# Patient Record
Sex: FEMALE | Race: ASIAN | NOT HISPANIC OR LATINO | URBAN - METROPOLITAN AREA
[De-identification: names, ages, dates, MRNs, and addresses within clinical notes are randomized per-mention and may not be internally consistent; named-entity substitution may affect disease eponyms.]

---

## 2018-08-13 ENCOUNTER — INPATIENT (INPATIENT)
Facility: HOSPITAL | Age: 63
LOS: 3 days | Discharge: HOME | DRG: 394 | End: 2018-08-17
Attending: SURGERY | Admitting: SURGERY
Payer: SELF-PAY

## 2018-08-13 VITALS
DIASTOLIC BLOOD PRESSURE: 75 MMHG | OXYGEN SATURATION: 96 % | SYSTOLIC BLOOD PRESSURE: 120 MMHG | HEART RATE: 74 BPM | RESPIRATION RATE: 18 BRPM

## 2018-08-13 PROCEDURE — 99291 CRITICAL CARE FIRST HOUR: CPT

## 2018-08-13 PROCEDURE — 99053 MED SERV 10PM-8AM 24 HR FAC: CPT

## 2018-08-14 DIAGNOSIS — K66.1 HEMOPERITONEUM: ICD-10-CM

## 2018-08-14 LAB
ALBUMIN SERPL ELPH-MCNC: 4 G/DL — SIGNIFICANT CHANGE UP (ref 3.3–5)
ALP SERPL-CCNC: 59 U/L — SIGNIFICANT CHANGE UP (ref 40–120)
ALT FLD-CCNC: 11 U/L — SIGNIFICANT CHANGE UP (ref 10–45)
ANION GAP SERPL CALC-SCNC: 12 MMOL/L — SIGNIFICANT CHANGE UP (ref 5–17)
APTT BLD: 26.3 SEC — LOW (ref 27.5–37.4)
AST SERPL-CCNC: 20 U/L — SIGNIFICANT CHANGE UP (ref 10–40)
BASOPHILS # BLD AUTO: 0 K/UL — SIGNIFICANT CHANGE UP (ref 0–0.2)
BASOPHILS NFR BLD AUTO: 0.5 % — SIGNIFICANT CHANGE UP (ref 0–2)
BILIRUB SERPL-MCNC: 0.6 MG/DL — SIGNIFICANT CHANGE UP (ref 0.2–1.2)
BLD GP AB SCN SERPL QL: NEGATIVE — SIGNIFICANT CHANGE UP
BUN SERPL-MCNC: 14 MG/DL — SIGNIFICANT CHANGE UP (ref 7–23)
CALCIUM SERPL-MCNC: 8.6 MG/DL — SIGNIFICANT CHANGE UP (ref 8.4–10.5)
CHLORIDE SERPL-SCNC: 106 MMOL/L — SIGNIFICANT CHANGE UP (ref 96–108)
CO2 SERPL-SCNC: 23 MMOL/L — SIGNIFICANT CHANGE UP (ref 22–31)
CREAT SERPL-MCNC: 0.58 MG/DL — SIGNIFICANT CHANGE UP (ref 0.5–1.3)
EOSINOPHIL # BLD AUTO: 0 K/UL — SIGNIFICANT CHANGE UP (ref 0–0.5)
EOSINOPHIL NFR BLD AUTO: 0.6 % — SIGNIFICANT CHANGE UP (ref 0–6)
GAS PNL BLDV: SIGNIFICANT CHANGE UP
GLUCOSE SERPL-MCNC: 116 MG/DL — HIGH (ref 70–99)
HCT VFR BLD CALC: 29.5 % — LOW (ref 34.5–45)
HCT VFR BLD CALC: 30.2 % — LOW (ref 34.5–45)
HCT VFR BLD CALC: 30.3 % — LOW (ref 34.5–45)
HCT VFR BLD CALC: 31.2 % — LOW (ref 34.5–45)
HGB BLD-MCNC: 10.1 G/DL — LOW (ref 11.5–15.5)
HGB BLD-MCNC: 10.2 G/DL — LOW (ref 11.5–15.5)
HGB BLD-MCNC: 10.4 G/DL — LOW (ref 11.5–15.5)
HGB BLD-MCNC: 10.8 G/DL — LOW (ref 11.5–15.5)
INR BLD: 1.03 RATIO — SIGNIFICANT CHANGE UP (ref 0.88–1.16)
LYMPHOCYTES # BLD AUTO: 1.2 K/UL — SIGNIFICANT CHANGE UP (ref 1–3.3)
LYMPHOCYTES # BLD AUTO: 17.3 % — SIGNIFICANT CHANGE UP (ref 13–44)
MCHC RBC-ENTMCNC: 32.4 PG — SIGNIFICANT CHANGE UP (ref 27–34)
MCHC RBC-ENTMCNC: 32.6 PG — SIGNIFICANT CHANGE UP (ref 27–34)
MCHC RBC-ENTMCNC: 32.7 PG — SIGNIFICANT CHANGE UP (ref 27–34)
MCHC RBC-ENTMCNC: 33 PG — SIGNIFICANT CHANGE UP (ref 27–34)
MCHC RBC-ENTMCNC: 33.7 GM/DL — SIGNIFICANT CHANGE UP (ref 32–36)
MCHC RBC-ENTMCNC: 34.1 GM/DL — SIGNIFICANT CHANGE UP (ref 32–36)
MCHC RBC-ENTMCNC: 34.2 GM/DL — SIGNIFICANT CHANGE UP (ref 32–36)
MCHC RBC-ENTMCNC: 34.5 GM/DL — SIGNIFICANT CHANGE UP (ref 32–36)
MCV RBC AUTO: 95.5 FL — SIGNIFICANT CHANGE UP (ref 80–100)
MCV RBC AUTO: 95.6 FL — SIGNIFICANT CHANGE UP (ref 80–100)
MCV RBC AUTO: 95.6 FL — SIGNIFICANT CHANGE UP (ref 80–100)
MCV RBC AUTO: 96 FL — SIGNIFICANT CHANGE UP (ref 80–100)
MONOCYTES # BLD AUTO: 0.3 K/UL — SIGNIFICANT CHANGE UP (ref 0–0.9)
MONOCYTES NFR BLD AUTO: 4.5 % — SIGNIFICANT CHANGE UP (ref 2–14)
NEUTROPHILS # BLD AUTO: 5.1 K/UL — SIGNIFICANT CHANGE UP (ref 1.8–7.4)
NEUTROPHILS NFR BLD AUTO: 77.1 % — HIGH (ref 43–77)
PLATELET # BLD AUTO: 144 K/UL — LOW (ref 150–400)
PLATELET # BLD AUTO: 150 K/UL — SIGNIFICANT CHANGE UP (ref 150–400)
PLATELET # BLD AUTO: 158 K/UL — SIGNIFICANT CHANGE UP (ref 150–400)
PLATELET # BLD AUTO: 170 K/UL — SIGNIFICANT CHANGE UP (ref 150–400)
POTASSIUM SERPL-MCNC: 3.8 MMOL/L — SIGNIFICANT CHANGE UP (ref 3.5–5.3)
POTASSIUM SERPL-SCNC: 3.8 MMOL/L — SIGNIFICANT CHANGE UP (ref 3.5–5.3)
PROT SERPL-MCNC: 6.9 G/DL — SIGNIFICANT CHANGE UP (ref 6–8.3)
PROTHROM AB SERPL-ACNC: 11.1 SEC — SIGNIFICANT CHANGE UP (ref 9.8–12.7)
RBC # BLD: 3.08 M/UL — LOW (ref 3.8–5.2)
RBC # BLD: 3.14 M/UL — LOW (ref 3.8–5.2)
RBC # BLD: 3.17 M/UL — LOW (ref 3.8–5.2)
RBC # BLD: 3.27 M/UL — LOW (ref 3.8–5.2)
RBC # FLD: 11.6 % — SIGNIFICANT CHANGE UP (ref 10.3–14.5)
RBC # FLD: 11.7 % — SIGNIFICANT CHANGE UP (ref 10.3–14.5)
RBC # FLD: 11.8 % — SIGNIFICANT CHANGE UP (ref 10.3–14.5)
RBC # FLD: 11.9 % — SIGNIFICANT CHANGE UP (ref 10.3–14.5)
RH IG SCN BLD-IMP: POSITIVE — SIGNIFICANT CHANGE UP
RH IG SCN BLD-IMP: POSITIVE — SIGNIFICANT CHANGE UP
SODIUM SERPL-SCNC: 141 MMOL/L — SIGNIFICANT CHANGE UP (ref 135–145)
WBC # BLD: 5.8 K/UL — SIGNIFICANT CHANGE UP (ref 3.8–10.5)
WBC # BLD: 6.3 K/UL — SIGNIFICANT CHANGE UP (ref 3.8–10.5)
WBC # BLD: 6.4 K/UL — SIGNIFICANT CHANGE UP (ref 3.8–10.5)
WBC # BLD: 6.6 K/UL — SIGNIFICANT CHANGE UP (ref 3.8–10.5)
WBC # FLD AUTO: 5.8 K/UL — SIGNIFICANT CHANGE UP (ref 3.8–10.5)
WBC # FLD AUTO: 6.3 K/UL — SIGNIFICANT CHANGE UP (ref 3.8–10.5)
WBC # FLD AUTO: 6.4 K/UL — SIGNIFICANT CHANGE UP (ref 3.8–10.5)
WBC # FLD AUTO: 6.6 K/UL — SIGNIFICANT CHANGE UP (ref 3.8–10.5)

## 2018-08-14 PROCEDURE — 99291 CRITICAL CARE FIRST HOUR: CPT

## 2018-08-14 RX ORDER — ACETAMINOPHEN 500 MG
650 TABLET ORAL EVERY 6 HOURS
Qty: 0 | Refills: 0 | Status: DISCONTINUED | OUTPATIENT
Start: 2018-08-14 | End: 2018-08-17

## 2018-08-14 RX ORDER — CHLORHEXIDINE GLUCONATE 213 G/1000ML
1 SOLUTION TOPICAL
Qty: 0 | Refills: 0 | Status: DISCONTINUED | OUTPATIENT
Start: 2018-08-14 | End: 2018-08-14

## 2018-08-14 RX ORDER — SODIUM CHLORIDE 9 MG/ML
1000 INJECTION, SOLUTION INTRAVENOUS
Qty: 0 | Refills: 0 | Status: DISCONTINUED | OUTPATIENT
Start: 2018-08-14 | End: 2018-08-15

## 2018-08-14 RX ORDER — HYDROMORPHONE HYDROCHLORIDE 2 MG/ML
0.25 INJECTION INTRAMUSCULAR; INTRAVENOUS; SUBCUTANEOUS
Qty: 0 | Refills: 0 | Status: DISCONTINUED | OUTPATIENT
Start: 2018-08-14 | End: 2018-08-15

## 2018-08-14 RX ADMIN — CHLORHEXIDINE GLUCONATE 1 APPLICATION(S): 213 SOLUTION TOPICAL at 06:40

## 2018-08-14 RX ADMIN — SODIUM CHLORIDE 100 MILLILITER(S): 9 INJECTION, SOLUTION INTRAVENOUS at 06:41

## 2018-08-14 RX ADMIN — Medication 650 MILLIGRAM(S): at 22:20

## 2018-08-14 RX ADMIN — Medication 650 MILLIGRAM(S): at 21:51

## 2018-08-14 NOTE — H&P ADULT - ASSESSMENT
63y female with apparent spontaneous hemoperitoneum of unclear etiology. Currently hemodynamically stable. HCT has fallen 5 points (although, it is unclear how much IVF patient received at [place]).

## 2018-08-14 NOTE — ED PROVIDER NOTE - PROGRESS NOTE DETAILS
Attending MD Pires: surgery contacted, they will see patient. Attending MD Pires: seen by surgical residents, they have reviewed imaging, they will discuss with their attending.

## 2018-08-14 NOTE — CONSULT NOTE ADULT - ATTENDING COMMENTS
Pt evaluated in AM round  Hemoperitoneum   Monitor serial H/H  Hemodynamically stable pain well controlled  Son kept updated

## 2018-08-14 NOTE — ED ADULT NURSE NOTE - OBJECTIVE STATEMENT
pt transferred from [facility] ED for blood in abd. as per pts son "she was swimming in pool when she c/o abd pain. after getting out of the pool she passed out and hit floor face down. C/o pain to abd all over that increases with movement" pt transferred from [facility] ED for blood in abd. as per pts son "she was swimming in pool when she c/o abd pain. after getting out of the pool she passed out and hit floor face down. C/o pain to abd all over that increases with movement"   lower lip with small amount dry blood, chin with abrasion and ecchymosis, rt knee with small abrasion

## 2018-08-14 NOTE — H&P ADULT - HISTORY OF PRESENT ILLNESS
63y female - no significant PMH/PSH - presenting with acute-onset abdominal pain that began at approximately 13:00 on the day prior to admission. She states that she was swimming laps in a pool (a frequent activity for her), when she felt a sudden, intense abdominal pain - most pronounced at her epigastric area and 10/10 in severity. She exited the pool to sit and rest on a bench, and began to feel dizzy. At this point, patient lost consciousness and fell forward, hitting her face as she did so. She awoke on the floor with a bleeding lip. An ambulance was called, and she was taken to [facility]. There, she was hemodynamically stable. A CTH obtained revealed no intra-cranial bleed. A CT abd/pelvis was notable for a significant amount of hemoperitoneum (fluid was noted to have Hounsfield units of 65) present with no free air and no apparent source of the bleeding. At this point, she was transferred to [facility] for further evaluation and treatment. HCT at [facility] was 36.  In the [facility] ED, patient reported diffuse abdominal pain (focal point at epigastrium) that was 4/10 at rest and nearly 10/10 with movement. She reports no continued dizziness, no weakness/numbness, no chest pain or SOB. Of note, she reports a long-standing history of low abdominal, crampy pain (going back 20 years). She has followed with GI and Gynecology to evaluate this complaint - as a result she had a colonoscopy 3 years prior which revealed a polyp (which was excised) and an EGD the year prior which was reportedly unremarkable. She also was evaluated by Gynecology 2 years prior, and her exam was reportedly unremarkable. She is currently post-menopausal.

## 2018-08-14 NOTE — H&P ADULT - NSHPPHYSICALEXAM_GEN_ALL_CORE
Vital Signs Last 24 Hrs  T(C): 36.4  T(F): 97.5  HR: 63  BP: 127/76  RR: 15  SpO2: 97%    GEN: NAD, alert and oriented x 3  HEENT: Clotted blood and edema of lower lip. No malocclusion, no tenderness/crepitus over facial bones. PERRLA, EOMI  CHEST: Symmetrical chest rise, no increased work of breathing, no stridor. Chest is atraumatic.  HEART: RRR, non-muffled heart sounds  ABD: Diffusely tender to deep palpation - tenderness is most pronounced over R alexis-abdomen. Non-distended, no guarding, no rebound tenderness.   EXT: No erythema/edema. Warm, sensate, motor function intact

## 2018-08-14 NOTE — ED PROVIDER NOTE - MEDICAL DECISION MAKING DETAILS
Attending MD Pires: Attending MD Pires: 63F transferred from OSH with ?hemoperitoneum, atraumatic. Unclear etiology for question of focus adjacent to stomach. Plan for large bore PIV access, T&S, urgent surgical consultation

## 2018-08-14 NOTE — CONSULT NOTE ADULT - ASSESSMENT
ASSESSMENT: 63y Female with no significant PMH or PSH who presents as transfer from OSH for CT concerning for hemoperitoneum.No pneumoperitoneum, no blush. Her Hct dropped from 36 at [facility] to 31 here. Pt states that she had a normal endoscopy and colonoscopy within the last 10 years and was seen by her ObGyn 2 years ago.    PLAN:   Neurologic:  - Pain control with IV Tylenol, Dilaudid PRN    Respiratory:  - Monitor RR, oxygen saturation; Goal SpO2 > 90%    Cardiovascular:    Gastrointestinal/Nutrition:    Genitourinary/Renal:    Hematologic:  - Hemorrhagic watch protocol    Infectious Disease:    Endocrine:    Disposition: ASSESSMENT: 63y Female with no significant PMH or PSH who presents as transfer from OSH for CT concerning for hemoperitoneum.No pneumoperitoneum, no blush. Her Hct dropped from 36 at [facility] to 31 here. Pt states that she had a normal endoscopy and colonoscopy within the last 10 years and was seen by her ObGyn 2 years ago.    PLAN:   Neurologic:  - Pain control with IV Tylenol, Dilaudid PRN    Respiratory:  - Monitor RR, oxygen saturation; Goal SpO2 > 90%    Cardiovascular:  - Monitor HR, BP    Gastrointestinal/Nutrition:  - NPO    Genitourinary/Renal:  - Monitor BUN/Cr    Hematologic:  - Trend Hgb/Hct    Infectious Disease:  - Monitor temp, WBC    Endocrine:  - Monitor blood glucose on BMP    Disposition: SICU

## 2018-08-14 NOTE — H&P ADULT - NSHPLABSRESULTS_GEN_ALL_CORE
10.8   6.6   )-----------( 170      ( 14 Aug 2018 00:29 )             31.2                 PT/INR - ( 14 Aug 2018 00:29 )   PT: 11.1 sec;   INR: 1.03 ratio         PTT - ( 14 Aug 2018 00:29 )  PTT:26.3 sec    08-14    141  |  106  |  14  ----------------------------<  116<H>  3.8   |  23  |  0.58    Ca    8.6      14 Aug 2018 00:29    TPro  6.9  /  Alb  4.0  /  TBili  0.6  /  DBili  x   /  AST  20  /  ALT  11  /  AlkPhos  59  08-14      LIVER FUNCTIONS - ( 14 Aug 2018 00:29 )  Alb: 4.0 g/dL / Pro: 6.9 g/dL / ALK PHOS: 59 U/L / ALT: 11 U/L / AST: 20 U/L / GGT: x                 IMAGING  OSH CT of the abdomen and pelvis with PO and IV contrast reviewed with in-house radiology resident and attending  - No free air  - Moderate hemoperitoneum, tracking around dome of the liver, along the greater curvature of the stomach and down the left para-colic gutter  - No cirrhosis or varicosities noted  - No hepatic or splenic lacerations  - No cystic lesions of the kidneys, ovaries noted  - No extravasation of PO contrast, no active extravasation of IV contrast  - No aneurysms of the abdominal vasculature noted

## 2018-08-14 NOTE — ED PROVIDER NOTE - OBJECTIVE STATEMENT
63F presents as transfer from OSH for CT concerning for hemoperitoneum. Patient states she was swimming and developed severe upper abd pain, she came out of the pool and syncopized hitting her head. The patient underwent CT at OSH with moderate hemoperitoneum in the LUQ without obvious source.

## 2018-08-14 NOTE — CHART NOTE - NSCHARTNOTEFT_GEN_A_CORE
Surgery- Serial Abdominal Exam    S: Pt seen and examined at bedside with Dr. Henry PGY2 as , pt Mandarin-speaking. Patient admits to improvement in abdominal pain since last night, worse in epigastric region now rather than RLQ.  Denies N/v, flatus, bm, cp, sob, palpitations, fever, HA.  PM CBC stable.    O:   Vital Signs Last 24 Hrs  T(C): 36.5 (14 Aug 2018 17:43), Max: 36.9 (14 Aug 2018 08:00)  T(F): 97.7 (14 Aug 2018 17:43), Max: 98.4 (14 Aug 2018 08:00)  HR: 71 (14 Aug 2018 17:43) (63 - 75)  BP: 119/81 (14 Aug 2018 17:43) (113/89 - 142/84)  BP(mean): 89 (14 Aug 2018 16:00) (89 - 108)  RR: 18 (14 Aug 2018 17:43) (14 - 33)  SpO2: 97% (14 Aug 2018 17:43) (95% - 98%)    Physical Exam:  Gen: resting comfortably in NAD. A& O  Abd exam: soft, ND, TTP epigastric and mild TTP in RLQ                          10.1   6.4   )-----------( 144      ( 14 Aug 2018 19:36 )             29.5    A/P: 63y female with apparent spontaneous hemoperitoneum of unclear etiology. Currently hemodynamically stable, transferred from SICU to floor 8/14.  - cont serial abdominal exams  - NPO/ivf  - pain control  - OOB ambulate  - f/u UA  - AM labs  -  will cont to monitor    Miracle Esparza PA-C   [id number]

## 2018-08-14 NOTE — ED ADULT NURSE NOTE - CHPI ED NUR SYMPTOMS NEG
no vomiting/no diarrhea/no nausea/no abdominal distension/no chills/no fever/no blood in stool/no dysuria/no hematuria/no burning urination

## 2018-08-14 NOTE — ED ADULT NURSE NOTE - NSIMPLEMENTINTERV_GEN_ALL_ED
Implemented All Fall with Harm Risk Interventions:  [place] to call system. Call bell, personal items and telephone within reach. Instruct patient to call for assistance. Room bathroom lighting operational. Non-slip footwear when patient is off stretcher. Physically safe environment: no spills, clutter or unnecessary equipment. Stretcher in lowest position, wheels locked, appropriate side rails in place. Provide visual cue, wrist band, yellow gown, etc. Monitor gait and stability. Monitor for mental status changes and reorient to person, place, and time. Review medications for side effects contributing to fall risk. Reinforce activity limits and safety measures with patient and family. Provide visual clues: red socks.

## 2018-08-14 NOTE — CHART NOTE - NSCHARTNOTEFT_GEN_A_CORE
ATP Transfer Note   -SICU to Floor    SCOTT TILLEY is a 63y Female with no significant PMH or PSH who presents as transfer from OSH for CT concerning for hemoperitoneum. Patient states she was swimming and developed severe upper abd pain, she came out of the pool and fainted hitting her head. The patient underwent CT at OSH with moderate hemoperitoneum in the LUQ without obvious source. Some blood tracking around stomach and liver. No pneumoperitoneum, no blush. Her Hct dropped from 36 at [facility] to 31 here. Pt states that she had a normal endoscopy and colonoscopy within the last 10 years and was seen by her ObGyn 2 years ago. She is Mandarin speaking only.    Interval events:  Patient has remained stable in the SICU.  Previously getting q4 CBC. NPO on IVF.  Pain control w/ tylenol. Hematocrit has remained stable, without an obvious source of bleeding, appropriate for transfer to the floor.     Vital Signs Last 24 Hrs  T(C): 36.5 (14 Aug 2018 17:43), Max: 36.9 (14 Aug 2018 08:00)  T(F): 97.7 (14 Aug 2018 17:43), Max: 98.4 (14 Aug 2018 08:00)  HR: 71 (14 Aug 2018 17:43) (63 - 75)  BP: 119/81 (14 Aug 2018 17:43) (113/89 - 142/84)  BP(mean): 89 (14 Aug 2018 16:00) (89 - 108)  RR: 18 (14 Aug 2018 17:43) (14 - 33)  SpO2: 97% (14 Aug 2018 17:43) (95% - 98%)    08-13-18 @ 07:01  -  08-14-18 @ 07:00  --------------------------------------------------------  IN: 400 mL / OUT: 850 mL / NET: -450 mL    08-14-18 @ 07:01  -  08-14-18 @ 17:46  --------------------------------------------------------  IN: 900 mL / OUT: 775 mL / NET: 125 mL      MEDICATIONS  (STANDING):  chlorhexidine 4% Liquid 1 Application(s) Topical <User Schedule>  lactated ringers. 1000 milliLiter(s) (100 mL/Hr) IV Continuous <Continuous>    MEDICATIONS  (PRN):  HYDROmorphone  Injectable 0.25 milliGRAM(s) IV Push every 3 hours PRN Severe Pain (7 - 10)    CBC (08-14 @ 10:23)                              10.4<L>                         5.8     )----------------(  150        --    % Neutrophils, --    % Lymphocytes, ANC: --                                  30.3<L>              CBC (08-14 @ 05:17)                              10.2<L>                         6.3     )----------------(  158        --    % Neutrophils, --    % Lymphocytes, ANC: --                                  30.2<L>                BMP (08-14 @ 00:29)             141     |  106     |  14    		Ca++ --      Ca 8.6                ---------------------------------( 116<H>		Mg --                 3.8     |  23      |  0.58  			Ph --        LFTs (08-14 @ 00:29)      TPro 6.9 / Alb 4.0 / TBili 0.6 / DBili -- / AST 20 / ALT 11 / AlkPhos 59    Coags (08-14 @ 00:29)  aPTT 26.3<L> / INR 1.03 / PT 11.1    ABG (08-14 @ 05:15)      /  /  /  /  / %     Lactate:   0.8    VBG (08-14 @ 05:15)     7.39 / 45 / 42 / 27 / 1.9 / 73%      PE: Gen: NAD  Pulm: Nonlabored breathing  CV: RRR  Abdomen: diffusely tender in lower quadrants, R>L  Ext: moves all extremities spontaneously    A: ASSESSMENT: 63y Female with no significant PMH or PSH who presents as transfer from OSH for CT concerning for hemoperitoneum.No pneumoperitoneum, no blush.  H&H has remained stable in the SICU after q4 checks.  Patient transferred to the floor.    P:   - CBC @ 6pm, q6cbc checks, consider increasing interval if remains stable in AM  - LR @ 100  - Multimodal pain control: tylenol and dilaudid  - DVT ppx  - Encourage IS/OOB  - NPO  - F/U AM labs    [id number]

## 2018-08-14 NOTE — CONSULT NOTE ADULT - SUBJECTIVE AND OBJECTIVE BOX
HISTORY OF PRESENT ILLNESS:    SCOTT TILLEY is a 63y Female with no significant PMH or PSH who presents as transfer from OSH for CT concerning for hemoperitoneum. Patient states she was swimming and developed severe upper abd pain, she came out of the pool and fainted hitting her head. The patient underwent CT at OSH with moderate hemoperitoneum in the LUQ without obvious source. Some blood tracking around stomach and liver. No pneumoperitoneum, no blush. Her Hct dropped from 36 at [facility] to 31 here. Pt states that she had a normal endoscopy and colonoscopy within the last 10 years and was seen by her ObGyn 2 years ago. She is Mandarin speaking only.    PAST MEDICAL HISTORY: No pertinent past medical history.      PAST SURGICAL HISTORY: No significant past surgical history      FAMILY HISTORY:     SOCIAL HISTORY:    CODE STATUS: Full code    HOME MEDICATIONS:    ALLERGIES: No Known Allergies      VITAL SIGNS:  ICU Vital Signs Last 24 Hrs  T(C): 36.7 (14 Aug 2018 00:07), Max: 36.7 (14 Aug 2018 00:07)  T(F): 98.1 (14 Aug 2018 00:07), Max: 98.1 (14 Aug 2018 00:07)  HR: 73 (14 Aug 2018 00:07) (73 - 74)  BP: 128/82 (14 Aug 2018 00:07) (120/75 - 128/82)  BP(mean): --  ABP: --  ABP(mean): --  RR: 17 (14 Aug 2018 00:07) (17 - 18)  SpO2: 97% (14 Aug 2018 00:07) (96% - 97%)      NEURO  Exam: alert and oriented      RESPIRATORY  Exam: no increased wob, CTAB      CARDIOVASCULAR  Exam: rrr, no m/r/g  Cardiac Rhythm: NSR      GI/NUTRITION  Exam: soft, mild diffuse tenderness, nondistended  Diet: NPO      GENITOURINARY/RENAL    08-14    141  |  106  |  14  ----------------------------<  116<H>  3.8   |  23  |  0.58    Ca    8.6      14 Aug 2018 00:29    TPro  6.9  /  Alb  4.0  /  TBili  0.6  /  DBili  x   /  AST  20  /  ALT  11  /  AlkPhos  59  08-14    [x] Parra catheter, indication: urine output monitoring in critically ill patient    HEMATOLOGIC  [ ] VTE Prophylaxis: Mechanical VTE ppx.                          10.8   6.6   )-----------( 170      ( 14 Aug 2018 00:29 )             31.2     PT/INR - ( 14 Aug 2018 00:29 )   PT: 11.1 sec;   INR: 1.03 ratio         PTT - ( 14 Aug 2018 00:29 )  PTT:26.3 sec  Transfusion: [ ] PRBC	[ ] Platelets	[ ] FFP	[ ] Cryoprecipitate      INFECTIOUS DISEASES    RECENT CULTURES:      ENDOCRINE    CAPILLARY BLOOD GLUCOSE      PATIENT CARE ACCESS DEVICES:  [x] Peripheral IV  [ ] Central Venous Line	[ ] R	[ ] L	[ ] IJ	[ ] Fem	[ ] SC	Placed:   [ ] Arterial Line		[ ] R	[ ] L	[ ] Fem	[ ] Rad	[ ] Ax	Placed:   [ ] PICC:					[ ] Mediport  [ ] Urinary Catheter, Date Placed:   [x] Necessity of urinary, arterial, and venous catheters discussed    OTHER MEDICATIONS:     IMAGING STUDIES: HISTORY OF PRESENT ILLNESS:    SCOTT TILLEY is a 63y Female with no significant PMH or PSH who presents as transfer from OSH for CT concerning for hemoperitoneum. Patient states she was swimming and developed severe upper abd pain, she came out of the pool and fainted hitting her head. The patient underwent CT at OSH with moderate hemoperitoneum in the LUQ without obvious source. Some blood tracking around stomach and liver. No pneumoperitoneum, no blush. Her Hct dropped from 36 at [facility] to 31 here. Pt states that she had a normal endoscopy and colonoscopy within the last 10 years and was seen by her ObGyn 2 years ago. She is Mandarin speaking only.    PAST MEDICAL HISTORY: No pertinent past medical history.      PAST SURGICAL HISTORY: No significant past surgical history      FAMILY HISTORY:     SOCIAL HISTORY:    CODE STATUS: Full code    HOME MEDICATIONS:    ALLERGIES: No Known Allergies      VITAL SIGNS:  ICU Vital Signs Last 24 Hrs  T(C): 36.7 (14 Aug 2018 00:07), Max: 36.7 (14 Aug 2018 00:07)  T(F): 98.1 (14 Aug 2018 00:07), Max: 98.1 (14 Aug 2018 00:07)  HR: 73 (14 Aug 2018 00:07) (73 - 74)  BP: 128/82 (14 Aug 2018 00:07) (120/75 - 128/82)  BP(mean): --  ABP: --  ABP(mean): --  RR: 17 (14 Aug 2018 00:07) (17 - 18)  SpO2: 97% (14 Aug 2018 00:07) (96% - 97%)      NEURO  Exam: alert and oriented      RESPIRATORY  Exam: no increased wob, CTAB      CARDIOVASCULAR  Exam: rrr, no m/r/g  Cardiac Rhythm: NSR      GI/NUTRITION  Exam: soft, diffuse tenderness, nondistended, not   Diet: NPO      GENITOURINARY/RENAL    08-14    141  |  106  |  14  ----------------------------<  116<H>  3.8   |  23  |  0.58    Ca    8.6      14 Aug 2018 00:29    TPro  6.9  /  Alb  4.0  /  TBili  0.6  /  DBili  x   /  AST  20  /  ALT  11  /  AlkPhos  59  08-14    [ ] Parra catheter: not indicated    HEMATOLOGIC  [ ] VTE Prophylaxis: Mechanical VTE ppx.                          10.8   6.6   )-----------( 170      ( 14 Aug 2018 00:29 )             31.2     PT/INR - ( 14 Aug 2018 00:29 )   PT: 11.1 sec;   INR: 1.03 ratio         PTT - ( 14 Aug 2018 00:29 )  PTT:26.3 sec  Transfusion: [ ] PRBC	[ ] Platelets	[ ] FFP	[ ] Cryoprecipitate      INFECTIOUS DISEASES    RECENT CULTURES:      ENDOCRINE    CAPILLARY BLOOD GLUCOSE      PATIENT CARE ACCESS DEVICES:  [x] Peripheral IV  [ ] Central Venous Line	[ ] R	[ ] L	[ ] IJ	[ ] Fem	[ ] SC	Placed:   [ ] Arterial Line		[ ] R	[ ] L	[ ] Fem	[ ] Rad	[ ] Ax	Placed:   [ ] PICC:					[ ] Mediport  [ ] Urinary Catheter, Date Placed:   [x] Necessity of urinary, arterial, and venous catheters discussed    OTHER MEDICATIONS:     IMAGING STUDIES:

## 2018-08-14 NOTE — H&P ADULT - NSHPLANGTRANSLATORFT_GEN_A_CORE
Translation provided by surgical resident, Dante Henry (fluent Mandarin speaker), at patient's request

## 2018-08-14 NOTE — H&P ADULT - PROBLEM SELECTOR PLAN 1
- Admit patient to Surgery  - NPO/IVF  - SICU consultation called for close hemodynamic monitoring and serial HCTs. As ongoing bleeding is not suspected at this time, observation is warranted.  - Hold pharmacologic DVT prophylaxis  - If patient becomes hemodynamically unstable, or if abdominal exam worsens significantly, will plan for rapid transfusion and emergent laparotomy for exploration and control of intra-abdominal hemorrhage

## 2018-08-15 LAB
ANION GAP SERPL CALC-SCNC: 16 MMOL/L — SIGNIFICANT CHANGE UP (ref 5–17)
APPEARANCE UR: ABNORMAL
BILIRUB UR-MCNC: NEGATIVE — SIGNIFICANT CHANGE UP
BUN SERPL-MCNC: 17 MG/DL — SIGNIFICANT CHANGE UP (ref 7–23)
CALCIUM SERPL-MCNC: 8.4 MG/DL — SIGNIFICANT CHANGE UP (ref 8.4–10.5)
CHLORIDE SERPL-SCNC: 106 MMOL/L — SIGNIFICANT CHANGE UP (ref 96–108)
CO2 SERPL-SCNC: 20 MMOL/L — LOW (ref 22–31)
COLOR SPEC: YELLOW — SIGNIFICANT CHANGE UP
CREAT SERPL-MCNC: 0.57 MG/DL — SIGNIFICANT CHANGE UP (ref 0.5–1.3)
DIFF PNL FLD: ABNORMAL
GLUCOSE SERPL-MCNC: 63 MG/DL — LOW (ref 70–99)
GLUCOSE UR QL: NEGATIVE — SIGNIFICANT CHANGE UP
HCT VFR BLD CALC: 26.5 % — LOW (ref 34.5–45)
HCT VFR BLD CALC: 28.9 % — LOW (ref 34.5–45)
HGB BLD-MCNC: 10 G/DL — LOW (ref 11.5–15.5)
HGB BLD-MCNC: 9.4 G/DL — LOW (ref 11.5–15.5)
KETONES UR-MCNC: ABNORMAL
LEUKOCYTE ESTERASE UR-ACNC: ABNORMAL
MAGNESIUM SERPL-MCNC: 2 MG/DL — SIGNIFICANT CHANGE UP (ref 1.6–2.6)
MCHC RBC-ENTMCNC: 33.3 PG — SIGNIFICANT CHANGE UP (ref 27–34)
MCHC RBC-ENTMCNC: 33.4 PG — SIGNIFICANT CHANGE UP (ref 27–34)
MCHC RBC-ENTMCNC: 34.7 GM/DL — SIGNIFICANT CHANGE UP (ref 32–36)
MCHC RBC-ENTMCNC: 35.4 GM/DL — SIGNIFICANT CHANGE UP (ref 32–36)
MCV RBC AUTO: 94.4 FL — SIGNIFICANT CHANGE UP (ref 80–100)
MCV RBC AUTO: 95.9 FL — SIGNIFICANT CHANGE UP (ref 80–100)
NITRITE UR-MCNC: NEGATIVE — SIGNIFICANT CHANGE UP
PH UR: 6 — SIGNIFICANT CHANGE UP (ref 5–8)
PHOSPHATE SERPL-MCNC: 3.3 MG/DL — SIGNIFICANT CHANGE UP (ref 2.5–4.5)
PLATELET # BLD AUTO: 130 K/UL — LOW (ref 150–400)
PLATELET # BLD AUTO: 140 K/UL — LOW (ref 150–400)
POTASSIUM SERPL-MCNC: 3.5 MMOL/L — SIGNIFICANT CHANGE UP (ref 3.5–5.3)
POTASSIUM SERPL-SCNC: 3.5 MMOL/L — SIGNIFICANT CHANGE UP (ref 3.5–5.3)
PROT UR-MCNC: 30 MG/DL
RBC # BLD: 2.81 M/UL — LOW (ref 3.8–5.2)
RBC # BLD: 3.01 M/UL — LOW (ref 3.8–5.2)
RBC # FLD: 11.5 % — SIGNIFICANT CHANGE UP (ref 10.3–14.5)
RBC # FLD: 11.8 % — SIGNIFICANT CHANGE UP (ref 10.3–14.5)
SODIUM SERPL-SCNC: 142 MMOL/L — SIGNIFICANT CHANGE UP (ref 135–145)
SP GR SPEC: 1.02 — SIGNIFICANT CHANGE UP (ref 1.01–1.02)
UROBILINOGEN FLD QL: NEGATIVE — SIGNIFICANT CHANGE UP
WBC # BLD: 4.1 K/UL — SIGNIFICANT CHANGE UP (ref 3.8–10.5)
WBC # BLD: 5.1 K/UL — SIGNIFICANT CHANGE UP (ref 3.8–10.5)
WBC # FLD AUTO: 4.1 K/UL — SIGNIFICANT CHANGE UP (ref 3.8–10.5)
WBC # FLD AUTO: 5.1 K/UL — SIGNIFICANT CHANGE UP (ref 3.8–10.5)

## 2018-08-15 PROCEDURE — 99233 SBSQ HOSP IP/OBS HIGH 50: CPT

## 2018-08-15 RX ORDER — DEXTROSE MONOHYDRATE, SODIUM CHLORIDE, AND POTASSIUM CHLORIDE 50; .745; 4.5 G/1000ML; G/1000ML; G/1000ML
1000 INJECTION, SOLUTION INTRAVENOUS
Qty: 0 | Refills: 0 | Status: DISCONTINUED | OUTPATIENT
Start: 2018-08-15 | End: 2018-08-16

## 2018-08-15 RX ORDER — OXYCODONE HYDROCHLORIDE 5 MG/1
5 TABLET ORAL EVERY 4 HOURS
Qty: 0 | Refills: 0 | Status: DISCONTINUED | OUTPATIENT
Start: 2018-08-15 | End: 2018-08-15

## 2018-08-15 RX ORDER — POTASSIUM CHLORIDE 20 MEQ
40 PACKET (EA) ORAL ONCE
Qty: 0 | Refills: 0 | Status: COMPLETED | OUTPATIENT
Start: 2018-08-15 | End: 2018-08-15

## 2018-08-15 RX ORDER — ENOXAPARIN SODIUM 100 MG/ML
40 INJECTION SUBCUTANEOUS EVERY 24 HOURS
Qty: 0 | Refills: 0 | Status: DISCONTINUED | OUTPATIENT
Start: 2018-08-15 | End: 2018-08-17

## 2018-08-15 RX ORDER — OXYCODONE HYDROCHLORIDE 5 MG/1
5 TABLET ORAL EVERY 4 HOURS
Qty: 0 | Refills: 0 | Status: DISCONTINUED | OUTPATIENT
Start: 2018-08-15 | End: 2018-08-17

## 2018-08-15 RX ADMIN — ENOXAPARIN SODIUM 40 MILLIGRAM(S): 100 INJECTION SUBCUTANEOUS at 12:04

## 2018-08-15 RX ADMIN — Medication 40 MILLIEQUIVALENT(S): at 12:04

## 2018-08-15 RX ADMIN — Medication 650 MILLIGRAM(S): at 12:04

## 2018-08-15 RX ADMIN — Medication 650 MILLIGRAM(S): at 12:34

## 2018-08-15 NOTE — PROGRESS NOTE ADULT - SUBJECTIVE AND OBJECTIVE BOX
ACS DAILY PROGRESS NOTE:       SUBJECTIVE/ROS: No events overnight- transferred out of SICU- hct stable @ 29  Denies nausea, vomiting, chest pain, shortness of breath         MEDICATIONS  (STANDING):  lactated ringers. 1000 milliLiter(s) (100 mL/Hr) IV Continuous <Continuous>    MEDICATIONS  (PRN):  acetaminophen   Tablet. 650 milliGRAM(s) Oral every 6 hours PRN Moderate Pain (4 - 6)  HYDROmorphone  Injectable 0.25 milliGRAM(s) IV Push every 3 hours PRN Severe Pain (7 - 10)      OBJECTIVE:    Vital Signs Last 24 Hrs  T(C): 36.7 (15 Aug 2018 05:13), Max: 36.9 (14 Aug 2018 08:00)  T(F): 98.1 (15 Aug 2018 05:13), Max: 98.5 (14 Aug 2018 21:06)  HR: 73 (15 Aug 2018 05:13) (63 - 73)  BP: 109/72 (15 Aug 2018 05:13) (109/72 - 142/84)  BP(mean): 89 (14 Aug 2018 16:00) (89 - 108)  RR: 18 (15 Aug 2018 05:13) (15 - 30)  SpO2: 95% (15 Aug 2018 05:13) (95% - 98%)        I&O's Detail    13 Aug 2018 07:01  -  14 Aug 2018 07:00  --------------------------------------------------------  IN:    lactated ringers.: 400 mL  Total IN: 400 mL    OUT:    Voided: 850 mL  Total OUT: 850 mL    Total NET: -450 mL      14 Aug 2018 07:01  -  15 Aug 2018 06:45  --------------------------------------------------------  IN:    lactated ringers.: 1900 mL    Oral Fluid: 240 mL  Total IN: 2140 mL    OUT:    Voided: 1125 mL  Total OUT: 1125 mL    Total NET: 1015 mL          Daily     Daily     LABS:                        10.1   6.4   )-----------( 144      ( 14 Aug 2018 19:36 )             29.5     08-14    141  |  106  |  14  ----------------------------<  116<H>  3.8   |  23  |  0.58    Ca    8.6      14 Aug 2018 00:29    TPro  6.9  /  Alb  4.0  /  TBili  0.6  /  DBili  x   /  AST  20  /  ALT  11  /  AlkPhos  59  08-14    PT/INR - ( 14 Aug 2018 00:29 )   PT: 11.1 sec;   INR: 1.03 ratio         PTT - ( 14 Aug 2018 00:29 )  PTT:26.3 sec            Physical Exam:  Gen: resting comfortably in NAD. A& O  Abd exam: soft, ND, TTP epigastric and mild TTP in RLQ ACS DAILY PROGRESS NOTE:       SUBJECTIVE/ROS: No events overnight- transferred out of SICU- hct stable  Denies nausea, vomiting, chest pain, shortness of breath         MEDICATIONS  (STANDING):  lactated ringers. 1000 milliLiter(s) (100 mL/Hr) IV Continuous <Continuous>    MEDICATIONS  (PRN):  acetaminophen   Tablet. 650 milliGRAM(s) Oral every 6 hours PRN Moderate Pain (4 - 6)  HYDROmorphone  Injectable 0.25 milliGRAM(s) IV Push every 3 hours PRN Severe Pain (7 - 10)      OBJECTIVE:    Vital Signs Last 24 Hrs  T(C): 36.7 (15 Aug 2018 05:13), Max: 36.9 (14 Aug 2018 08:00)  T(F): 98.1 (15 Aug 2018 05:13), Max: 98.5 (14 Aug 2018 21:06)  HR: 73 (15 Aug 2018 05:13) (63 - 73)  BP: 109/72 (15 Aug 2018 05:13) (109/72 - 142/84)  BP(mean): 89 (14 Aug 2018 16:00) (89 - 108)  RR: 18 (15 Aug 2018 05:13) (15 - 30)  SpO2: 95% (15 Aug 2018 05:13) (95% - 98%)        I&O's Detail    13 Aug 2018 07:01  -  14 Aug 2018 07:00  --------------------------------------------------------  IN:    lactated ringers.: 400 mL  Total IN: 400 mL    OUT:    Voided: 850 mL  Total OUT: 850 mL    Total NET: -450 mL      14 Aug 2018 07:01  -  15 Aug 2018 06:45  --------------------------------------------------------  IN:    lactated ringers.: 1900 mL    Oral Fluid: 240 mL  Total IN: 2140 mL    OUT:    Voided: 1125 mL  Total OUT: 1125 mL    Total NET: 1015 mL          Daily     Daily     LABS:                        10.1   6.4   )-----------( 144      ( 14 Aug 2018 19:36 )             29.5     08-14    141  |  106  |  14  ----------------------------<  116<H>  3.8   |  23  |  0.58    Ca    8.6      14 Aug 2018 00:29    TPro  6.9  /  Alb  4.0  /  TBili  0.6  /  DBili  x   /  AST  20  /  ALT  11  /  AlkPhos  59  08-14    PT/INR - ( 14 Aug 2018 00:29 )   PT: 11.1 sec;   INR: 1.03 ratio         PTT - ( 14 Aug 2018 00:29 )  PTT:26.3 sec            Physical Exam:  Gen: resting comfortably in NAD. A& O  Abd exam: soft, ND, TTP epigastric and mild TTP in RLQ

## 2018-08-15 NOTE — PROGRESS NOTE ADULT - ATTENDING COMMENTS
Pt seen and examined, agree with above. Pt says she is feeling better, with decreased abdominal pain, although she has slight increase in pain with liquids this morning. No nausea. Acute posthemorrhagic anemia: slight down-trend in hct, but doesn't seem significant. Will start chemical DVT prophylaxis. If feeling well this evening, advance diet. Source of hemoperitoneum unclear, will need repeat imaging in 1-2 months to reassess for abnormalities after reabsorption of hemoperitoneum.

## 2018-08-15 NOTE — PROGRESS NOTE ADULT - ASSESSMENT
A/P: 63y female with apparent spontaneous hemoperitoneum of unclear etiology. Currently hemodynamically stable, transferred from SICU to floor 8/14.    - cont serial abdominal exams  - NPO/ivf  - pain control  - OOB ambulate  - f/u UA  - AM labs      Richa Valdovinos PA-C [id number] A/P: 63y female with apparent spontaneous hemoperitoneum of unclear etiology. Currently hemodynamically stable, transferred from SICU to floor 8/14.    - Reg diet  - OOB/Ambulate  - pain control      Richa Valdovinos PA-C [id number] A/P: 63y female with apparent spontaneous hemoperitoneum of unclear etiology. Currently hemodynamically stable, transferred from SICU to floor 8/14.    - CLD  - OOB/Ambulate  - pain control      Richa Valdovinos PA-C [id number]

## 2018-08-16 LAB
ANION GAP SERPL CALC-SCNC: 10 MMOL/L — SIGNIFICANT CHANGE UP (ref 5–17)
BUN SERPL-MCNC: 8 MG/DL — SIGNIFICANT CHANGE UP (ref 7–23)
CALCIUM SERPL-MCNC: 8.6 MG/DL — SIGNIFICANT CHANGE UP (ref 8.4–10.5)
CHLORIDE SERPL-SCNC: 103 MMOL/L — SIGNIFICANT CHANGE UP (ref 96–108)
CO2 SERPL-SCNC: 24 MMOL/L — SIGNIFICANT CHANGE UP (ref 22–31)
CREAT SERPL-MCNC: 0.6 MG/DL — SIGNIFICANT CHANGE UP (ref 0.5–1.3)
GLUCOSE SERPL-MCNC: 93 MG/DL — SIGNIFICANT CHANGE UP (ref 70–99)
HCT VFR BLD CALC: 29.4 % — LOW (ref 34.5–45)
HGB BLD-MCNC: 9.9 G/DL — LOW (ref 11.5–15.5)
MAGNESIUM SERPL-MCNC: 2.1 MG/DL — SIGNIFICANT CHANGE UP (ref 1.6–2.6)
MCHC RBC-ENTMCNC: 32.2 PG — SIGNIFICANT CHANGE UP (ref 27–34)
MCHC RBC-ENTMCNC: 33.6 GM/DL — SIGNIFICANT CHANGE UP (ref 32–36)
MCV RBC AUTO: 95.9 FL — SIGNIFICANT CHANGE UP (ref 80–100)
PHOSPHATE SERPL-MCNC: 2.9 MG/DL — SIGNIFICANT CHANGE UP (ref 2.5–4.5)
PLATELET # BLD AUTO: 152 K/UL — SIGNIFICANT CHANGE UP (ref 150–400)
POTASSIUM SERPL-MCNC: 3.6 MMOL/L — SIGNIFICANT CHANGE UP (ref 3.5–5.3)
POTASSIUM SERPL-SCNC: 3.6 MMOL/L — SIGNIFICANT CHANGE UP (ref 3.5–5.3)
RBC # BLD: 3.07 M/UL — LOW (ref 3.8–5.2)
RBC # FLD: 11.6 % — SIGNIFICANT CHANGE UP (ref 10.3–14.5)
SODIUM SERPL-SCNC: 137 MMOL/L — SIGNIFICANT CHANGE UP (ref 135–145)
WBC # BLD: 3.4 K/UL — LOW (ref 3.8–10.5)
WBC # FLD AUTO: 3.4 K/UL — LOW (ref 3.8–10.5)

## 2018-08-16 RX ORDER — POTASSIUM CHLORIDE 20 MEQ
40 PACKET (EA) ORAL ONCE
Qty: 0 | Refills: 0 | Status: COMPLETED | OUTPATIENT
Start: 2018-08-16 | End: 2018-08-16

## 2018-08-16 RX ADMIN — Medication 40 MILLIEQUIVALENT(S): at 12:06

## 2018-08-16 RX ADMIN — ENOXAPARIN SODIUM 40 MILLIGRAM(S): 100 INJECTION SUBCUTANEOUS at 12:06

## 2018-08-16 NOTE — PROGRESS NOTE ADULT - ASSESSMENT
A/P: 63y female with apparent spontaneous hemoperitoneum of unclear etiology. Currently hemodynamically stable, transferred from SICU to floor 8/14.    - Reg diet  - OOB/Ambulate  - pain control      Richa Valdovinos PA-C [id number]

## 2018-08-16 NOTE — PROGRESS NOTE ADULT - SUBJECTIVE AND OBJECTIVE BOX
ACS DAILY PROGRESS NOTE:       SUBJECTIVE/ROS: Patient feels well- some pain with clears unchanged  Denies nausea, vomiting, chest pain, shortness of breath         MEDICATIONS  (STANDING):  enoxaparin Injectable 40 milliGRAM(s) SubCutaneous every 24 hours    MEDICATIONS  (PRN):  acetaminophen   Tablet. 650 milliGRAM(s) Oral every 6 hours PRN Moderate Pain (4 - 6)  oxyCODONE    IR 5 milliGRAM(s) Oral every 4 hours PRN Severe Pain (7 - 10)      OBJECTIVE:    Vital Signs Last 24 Hrs  T(C): 36.9 (16 Aug 2018 16:50), Max: 37.4 (16 Aug 2018 05:00)  T(F): 98.5 (16 Aug 2018 16:50), Max: 99.4 (16 Aug 2018 05:00)  HR: 74 (16 Aug 2018 16:50) (68 - 92)  BP: 128/80 (16 Aug 2018 16:50) (114/79 - 128/80)  BP(mean): --  RR: 18 (16 Aug 2018 16:50) (18 - 18)  SpO2: 96% (16 Aug 2018 16:50) (95% - 97%)        I&O's Detail    15 Aug 2018 07:01  -  16 Aug 2018 07:00  --------------------------------------------------------  IN:    dextrose 5% + sodium chloride 0.45% with potassium chloride 20 mEq/L: 1350 mL    lactated ringers.: 400 mL    Oral Fluid: 1100 mL  Total IN: 2850 mL    OUT:  Total OUT: 0 mL    Total NET: 2850 mL      16 Aug 2018 07:01  -  16 Aug 2018 17:26  --------------------------------------------------------  IN:    Oral Fluid: 600 mL  Total IN: 600 mL    OUT:  Total OUT: 0 mL    Total NET: 600 mL          Daily     Daily     LABS:                        9.9    3.4   )-----------( 152      ( 16 Aug 2018 07:05 )             29.4     08-16    137  |  103  |  8   ----------------------------<  93  3.6   |  24  |  0.60    Ca    8.6      16 Aug 2018 07:01  Phos  2.9     08-16  Mg     2.1     08-16        Urinalysis Basic - ( 15 Aug 2018 07:02 )    Color: Yellow / Appearance: SL Turbid / S.022 / pH: x  Gluc: x / Ketone: Large  / Bili: Negative / Urobili: Negative   Blood: x / Protein: 30 mg/dL / Nitrite: Negative   Leuk Esterase: Large / RBC: 2-5 /HPF / WBC 25-50 /HPF   Sq Epi: x / Non Sq Epi: Occasional /HPF / Bacteria: Moderate /HPF                Physical Exam:  Gen: resting comfortably in NAD. A& O  Abd exam: soft, ND, TTP epigastric and mild TTP in RLQ

## 2018-08-17 VITALS
OXYGEN SATURATION: 97 % | RESPIRATION RATE: 18 BRPM | HEART RATE: 112 BPM | TEMPERATURE: 99 F | SYSTOLIC BLOOD PRESSURE: 118 MMHG | DIASTOLIC BLOOD PRESSURE: 78 MMHG

## 2018-08-17 LAB
ANION GAP SERPL CALC-SCNC: 13 MMOL/L — SIGNIFICANT CHANGE UP (ref 5–17)
BUN SERPL-MCNC: 10 MG/DL — SIGNIFICANT CHANGE UP (ref 7–23)
CALCIUM SERPL-MCNC: 9 MG/DL — SIGNIFICANT CHANGE UP (ref 8.4–10.5)
CHLORIDE SERPL-SCNC: 104 MMOL/L — SIGNIFICANT CHANGE UP (ref 96–108)
CO2 SERPL-SCNC: 23 MMOL/L — SIGNIFICANT CHANGE UP (ref 22–31)
CREAT SERPL-MCNC: 0.57 MG/DL — SIGNIFICANT CHANGE UP (ref 0.5–1.3)
GLUCOSE SERPL-MCNC: 98 MG/DL — SIGNIFICANT CHANGE UP (ref 70–99)
HCT VFR BLD CALC: 30.2 % — LOW (ref 34.5–45)
HGB BLD-MCNC: 10.5 G/DL — LOW (ref 11.5–15.5)
MAGNESIUM SERPL-MCNC: 2 MG/DL — SIGNIFICANT CHANGE UP (ref 1.6–2.6)
MCHC RBC-ENTMCNC: 32.8 PG — SIGNIFICANT CHANGE UP (ref 27–34)
MCHC RBC-ENTMCNC: 34.7 GM/DL — SIGNIFICANT CHANGE UP (ref 32–36)
MCV RBC AUTO: 94.5 FL — SIGNIFICANT CHANGE UP (ref 80–100)
PHOSPHATE SERPL-MCNC: 3.6 MG/DL — SIGNIFICANT CHANGE UP (ref 2.5–4.5)
PLATELET # BLD AUTO: 166 K/UL — SIGNIFICANT CHANGE UP (ref 150–400)
POTASSIUM SERPL-MCNC: 3.5 MMOL/L — SIGNIFICANT CHANGE UP (ref 3.5–5.3)
POTASSIUM SERPL-SCNC: 3.5 MMOL/L — SIGNIFICANT CHANGE UP (ref 3.5–5.3)
RBC # BLD: 3.2 M/UL — LOW (ref 3.8–5.2)
RBC # FLD: 11.1 % — SIGNIFICANT CHANGE UP (ref 10.3–14.5)
SODIUM SERPL-SCNC: 140 MMOL/L — SIGNIFICANT CHANGE UP (ref 135–145)
WBC # BLD: 3.5 K/UL — LOW (ref 3.8–10.5)
WBC # FLD AUTO: 3.5 K/UL — LOW (ref 3.8–10.5)

## 2018-08-17 PROCEDURE — 80053 COMPREHEN METABOLIC PANEL: CPT

## 2018-08-17 PROCEDURE — 82803 BLOOD GASES ANY COMBINATION: CPT

## 2018-08-17 PROCEDURE — 84295 ASSAY OF SERUM SODIUM: CPT

## 2018-08-17 PROCEDURE — 86900 BLOOD TYPING SEROLOGIC ABO: CPT

## 2018-08-17 PROCEDURE — 99285 EMERGENCY DEPT VISIT HI MDM: CPT

## 2018-08-17 PROCEDURE — 85014 HEMATOCRIT: CPT

## 2018-08-17 PROCEDURE — 84132 ASSAY OF SERUM POTASSIUM: CPT

## 2018-08-17 PROCEDURE — 83605 ASSAY OF LACTIC ACID: CPT

## 2018-08-17 PROCEDURE — 81001 URINALYSIS AUTO W/SCOPE: CPT

## 2018-08-17 PROCEDURE — 82330 ASSAY OF CALCIUM: CPT

## 2018-08-17 PROCEDURE — 83735 ASSAY OF MAGNESIUM: CPT

## 2018-08-17 PROCEDURE — 82435 ASSAY OF BLOOD CHLORIDE: CPT

## 2018-08-17 PROCEDURE — 85730 THROMBOPLASTIN TIME PARTIAL: CPT

## 2018-08-17 PROCEDURE — 86850 RBC ANTIBODY SCREEN: CPT

## 2018-08-17 PROCEDURE — 85027 COMPLETE CBC AUTOMATED: CPT

## 2018-08-17 PROCEDURE — 85610 PROTHROMBIN TIME: CPT

## 2018-08-17 PROCEDURE — 82947 ASSAY GLUCOSE BLOOD QUANT: CPT

## 2018-08-17 PROCEDURE — 84100 ASSAY OF PHOSPHORUS: CPT

## 2018-08-17 PROCEDURE — 99238 HOSP IP/OBS DSCHRG MGMT 30/<: CPT

## 2018-08-17 PROCEDURE — 80048 BASIC METABOLIC PNL TOTAL CA: CPT

## 2018-08-17 PROCEDURE — 86901 BLOOD TYPING SEROLOGIC RH(D): CPT

## 2018-08-17 RX ORDER — OXYCODONE HYDROCHLORIDE 5 MG/1
1 TABLET ORAL
Qty: 10 | Refills: 0 | OUTPATIENT
Start: 2018-08-17

## 2018-08-17 RX ORDER — POTASSIUM CHLORIDE 20 MEQ
20 PACKET (EA) ORAL
Qty: 0 | Refills: 0 | Status: COMPLETED | OUTPATIENT
Start: 2018-08-17 | End: 2018-08-17

## 2018-08-17 RX ORDER — ACETAMINOPHEN 500 MG
2 TABLET ORAL
Qty: 0 | Refills: 0 | COMMUNITY
Start: 2018-08-17

## 2018-08-17 RX ADMIN — Medication 20 MILLIEQUIVALENT(S): at 09:09

## 2018-08-17 RX ADMIN — Medication 20 MILLIEQUIVALENT(S): at 12:04

## 2018-08-17 RX ADMIN — ENOXAPARIN SODIUM 40 MILLIGRAM(S): 100 INJECTION SUBCUTANEOUS at 12:04

## 2018-08-17 NOTE — PROGRESS NOTE ADULT - ASSESSMENT
A/P: 63y female with apparent spontaneous hemoperitoneum of unclear etiology. Currently hemodynamically stable, transferred from SICU to floor 8/14.    - Mandarin phone  used to discuss discharge instructions including avoid strenuous activity, avoid blood thinners, no contact sports for 4 weeks.  Patient instructed to f/u with Dr. Booth within 2-4 weeks. Will provide translated instructions of discharge summary and follow-up instructions.   - Reg diet  - OOB/Ambulate  - pain control  - 3x stitches removed    [phone]

## 2018-08-17 NOTE — DISCHARGE NOTE ADULT - MEDICATION SUMMARY - MEDICATIONS TO TAKE
I will START or STAY ON the medications listed below when I get home from the hospital:    acetaminophen 325 mg oral tablet  -- 2 tab(s) by mouth every 6 hours, As needed, Moderate Pain (4 - 6)  -- Indication: For moderate pain    oxyCODONE 5 mg oral tablet  -- 1 tab(s) by mouth every 4 hours, As needed, Severe Pain (7 - 10) MDD:6  -- Indication: For severe pain

## 2018-08-17 NOTE — PROGRESS NOTE ADULT - SUBJECTIVE AND OBJECTIVE BOX
ACS DAILY PROGRESS NOTE:       SUBJECTIVE/ROS: Patient feels well- pain improved  Expresses interest to be discharged  4x Lip stitches removed  Denies CP/SOB/LH/Dizziness    Vital Signs Last 24 Hrs  T(C): 36.7 (17 Aug 2018 14:11), Max: 37.1 (16 Aug 2018 21:30)  T(F): 98 (17 Aug 2018 14:11), Max: 98.8 (16 Aug 2018 21:30)  HR: 73 (17 Aug 2018 14:11) (66 - 92)  BP: 129/89 (17 Aug 2018 14:11) (115/83 - 130/82)  BP(mean): --  RR: 18 (17 Aug 2018 14:11) (18 - 18)  SpO2: 94% (17 Aug 2018 14:11) (94% - 96%)    08-16-18 @ 07:01  -  08-17-18 @ 07:00  --------------------------------------------------------  IN: 1160 mL / OUT: 0 mL / NET: 1160 mL    08-17-18 @ 07:01  -  08-17-18 @ 15:41  --------------------------------------------------------  IN: 240 mL / OUT: 0 mL / NET: 240 mL      MEDICATIONS  (STANDING):  enoxaparin Injectable 40 milliGRAM(s) SubCutaneous every 24 hours    MEDICATIONS  (PRN):  acetaminophen   Tablet. 650 milliGRAM(s) Oral every 6 hours PRN Moderate Pain (4 - 6)  oxyCODONE    IR 5 milliGRAM(s) Oral every 4 hours PRN Severe Pain (7 - 10)    CBC (08-17 @ 07:31)                              10.5<L>                         3.5<L>  )----------------(  166        --    % Neutrophils, --    % Lymphocytes, ANC: --                                  30.2<L>              CBC (08-16 @ 07:05)                              9.9<L>                         3.4<L>  )----------------(  152        --    % Neutrophils, --    % Lymphocytes, ANC: --                                  29.4<L>                BMP (08-17 @ 07:27)             140     |  104     |  10    		Ca++ --      Ca 9.0                ---------------------------------( 98    		Mg 2.0                3.5     |  23      |  0.57  			Ph 3.6     BMP (08-16 @ 07:01)             137     |  103     |  8     		Ca++ --      Ca 8.6                ---------------------------------( 93    		Mg 2.1                3.6     |  24      |  0.60  			Ph 2.9               Physical Exam:  Gen: resting comfortably in NAD. A&O  Pulm: nonlabored breathing on room air  Abd exam: soft, ND, TTP epigastric and mild TTP in RLQ  Ext: moves all ext spontaneously

## 2018-08-17 NOTE — DISCHARGE NOTE ADULT - PLAN OF CARE
Recovery ACTIVITY: No heavy lifting or straining. Otherwise, you may return to your usual level of physical activity. If you are taking narcotic pain medication (such as Percocet), do NOT drive a car, operate machinery or make important decisions.  DIET: Return to your usual diet.  NOTIFY YOUR SURGEON IF: You have any bleeding, persistent nausea/vomiting, persistent diarrhea, or if your pain is not controlled on your discharge pain medications.  FOLLOW-UP:  1. Follow-up with Dr. Booth within 1-2 weeks of discharge.  Please call office for appointment  2. Please follow up with your primary care physician in one week regarding your hospitalization. ACTIVITY: no heavy lifting, no strenuous exercise for 4 weeks, no contact sports   . Otherwise, you may return to your usual level of physical activity. If you are taking narcotic pain medication (such as oxycodone), do NOT drive a car, operate machinery or make important decisions.  DIET: Return to your usual diet.  NOTIFY YOUR SURGEON IF: You have any bleeding, persistent nausea/vomiting, persistent diarrhea, or if your pain is not controlled on your discharge pain medications.  FOLLOW-UP:  1. Follow-up with Dr. Booth within 4 weeks of discharge.  Please call office for appointment  2. Please follow up with your primary care physician in one week regarding your hospitalization.      NO blood thinners or antinflammatory meds - Motrin, ibuprofen, aleve     notify Dr. Booth if develop nausea, vomiting, increased abdominal pain, increased abdominal distention, fever, chills ACTIVITY: no heavy lifting, no strenuous exercise for 4 weeks, no strenuous swimming, no contact sports.  . Otherwise, you may return to your usual level of physical activity. If you are taking narcotic pain medication (such as oxycodone), do NOT drive a car, operate machinery or make important decisions.  DIET: Return to your usual diet.  NOTIFY YOUR SURGEON IF: You have any bleeding, persistent nausea/vomiting, persistent diarrhea, or if your pain is not controlled on your discharge pain medications.  FOLLOW-UP:  1. Follow-up with Dr. Booth within 4 weeks of discharge.  Please call office [phone] for appointment  2. Please follow up with your primary care physician in one week regarding your hospitalization.      NO blood thinners or antinflammatory meds - Motrin, ibuprofen, aleve, advil.    notify Dr. Booth if develop nausea, vomiting, increased abdominal pain, increased abdominal distention, fever, chills

## 2018-08-17 NOTE — DISCHARGE NOTE ADULT - CARE PROVIDER_API CALL
Xavi Booth), Surgery; Surgical Critical Care  [address]  [address] [address]  Phone: [phone]  Fax: [phone]

## 2018-08-17 NOTE — DISCHARGE NOTE ADULT - CARE PLAN
Principal Discharge DX:	Hemoperitoneum  Goal:	Recovery  Assessment and plan of treatment:	ACTIVITY: No heavy lifting or straining. Otherwise, you may return to your usual level of physical activity. If you are taking narcotic pain medication (such as Percocet), do NOT drive a car, operate machinery or make important decisions.  DIET: Return to your usual diet.  NOTIFY YOUR SURGEON IF: You have any bleeding, persistent nausea/vomiting, persistent diarrhea, or if your pain is not controlled on your discharge pain medications.  FOLLOW-UP:  1. Follow-up with Dr. Booth within 1-2 weeks of discharge.  Please call office for appointment  2. Please follow up with your primary care physician in one week regarding your hospitalization. Principal Discharge DX:	Hemoperitoneum  Goal:	Recovery  Assessment and plan of treatment:	ACTIVITY: no heavy lifting, no strenuous exercise for 4 weeks, no contact sports   . Otherwise, you may return to your usual level of physical activity. If you are taking narcotic pain medication (such as oxycodone), do NOT drive a car, operate machinery or make important decisions.  DIET: Return to your usual diet.  NOTIFY YOUR SURGEON IF: You have any bleeding, persistent nausea/vomiting, persistent diarrhea, or if your pain is not controlled on your discharge pain medications.  FOLLOW-UP:  1. Follow-up with Dr. Booth within 4 weeks of discharge.  Please call office for appointment  2. Please follow up with your primary care physician in one week regarding your hospitalization.      NO blood thinners or antinflammatory meds - Motrin, ibuprofen, aleve     notify Dr. Booth if develop nausea, vomiting, increased abdominal pain, increased abdominal distention, fever, chills Principal Discharge DX:	Hemoperitoneum  Goal:	Recovery  Assessment and plan of treatment:	ACTIVITY: no heavy lifting, no strenuous exercise for 4 weeks, no strenuous swimming, no contact sports.  . Otherwise, you may return to your usual level of physical activity. If you are taking narcotic pain medication (such as oxycodone), do NOT drive a car, operate machinery or make important decisions.  DIET: Return to your usual diet.  NOTIFY YOUR SURGEON IF: You have any bleeding, persistent nausea/vomiting, persistent diarrhea, or if your pain is not controlled on your discharge pain medications.  FOLLOW-UP:  1. Follow-up with Dr. Booth within 4 weeks of discharge.  Please call office [phone] for appointment  2. Please follow up with your primary care physician in one week regarding your hospitalization.      NO blood thinners or antinflammatory meds - Motrin, ibuprofen, aleve, advil.    notify Dr. Booth if develop nausea, vomiting, increased abdominal pain, increased abdominal distention, fever, chills

## 2018-08-17 NOTE — DISCHARGE NOTE ADULT - HOSPITAL COURSE
63y female - no significant PMH/PSH - presenting with acute-onset abdominal pain that began at approximately 13:00 on the day prior to admission. She states that she was swimming laps in a pool (a frequent activity for her), when she felt a sudden, intense abdominal pain - most pronounced at her epigastric area and 10/10 in severity. She exited the pool to sit and rest on a bench, and began to feel dizzy. At this point, patient lost consciousness and fell forward, hitting her face as she did so. She awoke on the floor with a bleeding lip. An ambulance was called, and she was taken to [facility]. There, she was hemodynamically stable. A CTH obtained revealed no intra-cranial bleed. A CT abd/pelvis was notable for a significant amount of hemoperitoneum (fluid was noted to have Hounsfield units of 65) present with no free air and no apparent source of the bleeding. At this point, she was transferred to [facility] for further evaluation and treatment. HCT at [place] was 36.  In the [facility] ED, patient reported diffuse abdominal pain (focal point at epigastrium) that was 4/10 at rest and nearly 10/10 with movement. She reports no continued dizziness, no weakness/numbness, no chest pain or SOB. Of note, she reports a long-standing history of low abdominal, crampy pain (going back 20 years). She has followed with GI and Gynecology to evaluate this complaint - as a result she had a colonoscopy 3 years prior which revealed a polyp (which was excised) and an EGD the year prior which was reportedly unremarkable. She also was evaluated by Gynecology 2 years prior, and her exam was reportedly unremarkable. She is currently post-menopausal. 63y female - no significant PMH/PSH - presenting with acute-onset abdominal pain that began at approximately 13:00 on the day prior to admission. She states that she was swimming laps in a pool (a frequent activity for her), when she felt a sudden, intense abdominal pain - most pronounced at her epigastric area and 10/10 in severity. She exited the pool to sit and rest on a bench, and began to feel dizzy. At this point, patient lost consciousness and fell forward, hitting her face as she did so. She awoke on the floor with a bleeding lip. An ambulance was called, and she was taken to [facility]. There, she was hemodynamically stable. A CTH obtained revealed no intra-cranial bleed. A CT abd/pelvis was notable for a significant amount of hemoperitoneum (fluid was noted to have Hounsfield units of 65) present with no free air and no apparent source of the bleeding. At this point, she was transferred to [facility] for further evaluation and treatment. HCT at [place] was 36.  In the [facility] ED, patient reported diffuse abdominal pain (focal point at epigastrium) that was 4/10 at rest and nearly 10/10 with movement. She reports no continued dizziness, no weakness/numbness, no chest pain or SOB. Of note, she reports a long-standing history of low abdominal, crampy pain (going back 20 years). She has followed with GI and Gynecology to evaluate this complaint - as a result she had a colonoscopy 3 years prior which revealed a polyp (which was excised) and an EGD the year prior which was reportedly unremarkable. She also was evaluated by Gynecology 2 years prior, and her exam was reportedly unremarkable. She is currently post-menopausal.  Serial abdominal exams performed and serial CBC's performed which stabilized and pt did not require any blood transfusions.  Diet was advanced from clears to regular as tolerated.  She is ambulating, voiding, is tolerating a diet, and is stable for discharge home.  She will follow-up with Dr. Booth and her PMD within 1-2 weeks. 63y female - no significant PMH/PSH - presenting with acute-onset abdominal pain that began at approximately 13:00 on the day prior to admission. She states that she was swimming laps in a pool (a frequent activity for her), when she felt a sudden, intense abdominal pain - most pronounced at her epigastric area and 10/10 in severity. She exited the pool to sit and rest on a bench, and began to feel dizzy. At this point, patient lost consciousness and fell forward, hitting her face as she did so. She awoke on the floor with a bleeding lip. An ambulance was called, and she was taken to [facility]. There, she was hemodynamically stable. A CTH obtained revealed no intra-cranial bleed. A CT abd/pelvis was notable for a significant amount of hemoperitoneum (fluid was noted to have Hounsfield units of 65) present with no free air and no apparent source of the bleeding. At this point, she was transferred to [facility] for further evaluation and treatment. HCT at [place] was 36.  In the [facility] ED, patient reported diffuse abdominal pain (focal point at epigastrium) that was 4/10 at rest and nearly 10/10 with movement. She reports no continued dizziness, no weakness/numbness, no chest pain or SOB. Of note, she reports a long-standing history of low abdominal, crampy pain (going back 20 years). She has followed with GI and Gynecology to evaluate this complaint - as a result she had a colonoscopy 3 years prior which revealed a polyp (which was excised) and an EGD the year prior which was reportedly unremarkable. Potassium was repleted during hospitalization for hypokalemia. She also was evaluated by Gynecology 2 years prior, and her exam was reportedly unremarkable. She is currently post-menopausal.  Serial abdominal exams performed and serial CBC's performed which stabilized and pt did not require any blood transfusions.  Diet was advanced from clears to regular as tolerated.  She is ambulating, voiding, is tolerating a diet, and is stable for discharge home.  She will follow-up with Dr. Booth and her PMD within 1-2 weeks.

## 2018-08-17 NOTE — DISCHARGE NOTE ADULT - PATIENT PORTAL LINK FT
You can access the [facility] Patient Portal, offered by [facility], by registering with the following website: [email or web address]

## 2018-08-17 NOTE — DISCHARGE NOTE ADULT - CONDITIONS AT DISCHARGE
Patient A&xo4. Patient VSS. Patient safety maintained. Patient IV removed with no signs/symptoms of redness/swelling.

## 2018-08-17 NOTE — DISCHARGE NOTE ADULT - ADDITIONAL INSTRUCTIONS
Follow-up with Dr. Booth within 1-2 weeks.  Please call office for appointment. Follow-up with Dr. Booth within 1-2 weeks.  Please call office for appointment.  Do NOT take any blood thinners (ASA, ibuprofen (aleve, motrin, advil), etc.) Follow-up with Dr. Booth within 1-2 weeks.  Please call office for appointment.  Do NOT take any blood thinners (ASA, ibuprofen (aleve, motrin, advil), etc.)    Avoid strenuous activity, avoid blood thinners, no contact sports for 4 weeks

## 2021-04-18 NOTE — ED PROVIDER NOTE - NS ED MD DISPO ADMIT [facility] UNIT
Assumed patient care at 1900. Patient alert, primarily Amharic speaking.  Patient will call for bathroom appropriately  No nausea or vomiting overnight  Patient is up X 1 to bathroom  Plan to continue to monitor and IV abx  Call light within reach, safety pre ICU

## 2021-09-27 NOTE — ED ADULT NURSE NOTE - PAIN: PRESENCE, MLM
Show Aperture Variable?: Yes Consent: The patient's consent was obtained including but not limited to risks of crusting, scabbing, blistering, scarring, darker or lighter pigmentary change, recurrence, incomplete removal and infection. Render Post-Care Instructions In Note?: no Detail Level: Detailed Post-Care Instructions: I reviewed with the patient in detail post-care instructions. Patient is to wear sunprotection, and avoid picking at any of the treated lesions. Pt may apply Vaseline to crusted or scabbing areas. Duration Of Freeze Thaw-Cycle (Seconds): 10 Number Of Freeze-Thaw Cycles: 2 freeze-thaw cycles complains of pain/discomfort

## 2022-03-02 NOTE — ED PROVIDER NOTE - NSCAREINITIATED _GEN_ER
[employer] message sent to patient.     Audrey Leyva, ZEKEN, RN  Medical Specialty Care Coordinator  [facility]     Sunny [place]ires(Attending)

## 2023-03-15 NOTE — DISCHARGE NOTE ADULT - FUNCTIONAL STATUS DATE
17-Aug-2018 Area M Indication Text: Tumors in this location are included in Area M (cheek, forehead, scalp, neck, jawline and pretibial skin).  Mohs surgery is indicated for tumors in these anatomic locations.

## 2023-07-13 NOTE — PATIENT PROFILE ADULT. - AS SC BRADEN FRICTION
agitation... agitation... agitation... agitation... agitation... agitation... agitation... agitation... (3) no apparent problem

## 2023-09-14 NOTE — H&P ADULT - NSHPLANGLIMITEDENGLISH_GEN_A_CORE
Per case management update, patient needs zyvox for d/c antibiotic management. Script written and given to outpatient pharmacy. Outpatient pharmacy unable to fill due to patient insurance being out of network. Script was faxed to second pharmacy, [facility] on [address] which would need precert.  notified on call  to work on precert for medication for patient discharge. Patient updated on plan of care. Yes

## 2023-09-15 NOTE — DISCHARGE NOTE ADULT - VISION (WITH CORRECTIVE LENSES IF THE PATIENT USUALLY WEARS THEM):
Follow up with primary care team/Cardiology for atherosclerotic calcifications abdominal aorta seen on XR LS Spine.
Normal vision: sees adequately in most situations; can see medication labels, newsprint

## 2024-07-23 NOTE — ED ADULT NURSE NOTE - NSFALLRSKHARMRISK_ED_ALL_ED
[facility] ENCOUNTER  HEMATOLOGY/ONCOLOGY PROGRESS NOTE      CHIEF COMPLAINT:  Breast cancer    HISTORY OF PRESENT ILLNESS:  Nataly Haile is a 76 year old female seen today for newly diagnosed breast cancer.  On 10/13/2020, she underwent screening bilateral mammography, which demonstrated an area of asymmetry in the left breast with increasing microcalcifications.  This led to diagnostic imaging and biopsy as noted below.    She feels well and notes no unexplained pains, headaches, dyspnea, cough, unintentional weight loss, neurologic symptoms or other systemic symptoms referable to malignancy.    She has breast exams at least twice a year through her primary care provider and Batsheva Hoyt.    ONCOLOGIC HISTORY:  1.  2009: Renal cell carcinoma: Partial nephrectomy. Pathology report not available.  2.  10/13/2020:  Screening bilateral mammography with an area of asymmetry in the left breast, more prominent than prior with increasing microcalcifications.  Diagnostic imaging showed no suspicious ultrasound findings but due to the increasing number of calcifications, imaging was felt suspicious and stereotactic biopsy was recommended.  3.  10/27/2020:  Ultrasound of the left axilla demonstrated no morphologically abnormal left axillary lymph nodes.  4.  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    Estrogen Receptor: NEGATIVE         Robina Score 0 (0 + 0); 0% of tumor nuclei,  none           Progesterone Receptor: NEGATIVE         Robina Score 0 (0 + 0); 0% of tumor nuclei,  none       HER2 by IHC: NEGATIVE (0)   5.  11/24/2020: A.   Breast, left, partial mastectomy:  -Residual high nuclear grade ductal carcinoma in situ with comedonecrosis and microcalcifications adjacent to previous  biopsy site changes, completely excised, Tis(DCIS) N0  -No definitive invasive carcinoma component identified    H.   [place] lymph nodes, right axilla, excision:  -2 lymph nodes negative for metastatic carcinoma (0/2)  BREAST BIOMARKERS  Block: A9 (left partial mastectomy with residual high-grade DCIS)    Estrogen Receptor: NEGATIVE         Robina Score 0 (0 + 0); 0% of tumor nuclei,  none          Progesterone Receptor: NEGATIVE         Robina Score 0 (0 + 0); 0% of tumor nuclei,  none  6. Radiation:   1/12/2021 - 2/8/2021             Course Prescriptions:       Course ID Plan ID Rx Dose (cGy) Fraction Status   1 L Breast 4,256 16 / 16 Treatment Approved   1 L Breast Bst 900 4 / 4 Treatment Approved             Course Delivery Details:       Ref. ID Planned Dose (cGy) Actual Dose (cGy)   L Breast Bst 900 900   L Breast 4,256 4,256           MEDICATIONS AND ALLERGIES:    Current Outpatient Medications   Medication Sig Dispense Refill    rosuvastatin (CRESTOR) 10 MG tablet TAKE 1 TABLET BY MOUTH EVERY EVENING 90 tablet 1    alendronate (FOSAMAX) 70 MG tablet Take 1 tablet by mouth every 7 days. 12 tablet 3    sulfamethoxazole-trimethoprim (BACTRIM DS) 800-160 MG per tablet Take 1 tablet by mouth after intercourse to prevent UTI. 90 tablet 3    allopurinol (ZYLOPRIM) 300 MG tablet Take 1 tablet by mouth daily. 90 tablet 3    famotidine (PEPCID) 20 MG tablet Take 1 tablet by mouth daily. 90 tablet 3    Cholecalciferol (VITAMIN D3) 5000 units Tab Take 5,000 Units by mouth daily.      Omega-3 Fatty Acids (FISH OIL) 1000 MG capsule Take 1 g by mouth daily.      Calcium Citrate-Vitamin D 500-500 MG-UNIT Pack Take 2 tablets by mouth daily.      Doxylamine Succinate, Sleep, 25 MG Tab Take 1 tablet by mouth nightly as needed (sleep).      polyethylene glycol (MIRALAX) powder Take 17 g by mouth daily. Patient must have appointment for further refills 527 g 0    multivitamin (THERAGRAN) per tablet Take 1 tablet by mouth  daily.        glucosamine-chondroitin 250-200 MG tablet Take 1 tablet by mouth daily.       No current facility-administered medications for this visit.     ALLERGIES:   Allergen Reactions    Mobic SWELLING       PROBLEM LIST:  Patient Active Problem List   Diagnosis    Recurrent UTI    Gout    Chronic constipation    Drusen of optic disc    Osteoporosis    Primary osteoarthritis of both hands    Malignant neoplasm of upper-outer quadrant of left breast in female, estrogen receptor negative  (CMD)    S/P bilateral breast reduction, left oncoplastic    Presbyopia of both eyes    Nuclear sclerotic cataract of both eyes    Hyperlipidemia LDL goal <130        HISTORIES:  Past medical history, surgical history, family history, and social history were reviewed and updated.  Past Medical History:   Diagnosis Date    Arthritis     bilateral hands    Breast cancer  (CMD) 10/2020    left breast    Cold sore     remote history    DDD (degenerative disc disease), lumbar     Diverticulosis of large intestine without diverticulitis 08/23/2016    Gastroesophageal reflux disease     Gout     Hemorrhoids 08/23/2016    mild internal     History of actinic keratosis     Nuclear sclerotic cataract of both eyes 04/06/2021    Other and unspecified hyperlipidemia     Personal history of radiation therapy     Renal cell adenocarcinoma  (CMD)     Spinal stenosis     Tinnitus aurium, bilateral     Urinary tract infection     controlled with antibiotic suppression     Past Surgical History:   Procedure Laterality Date    ------------other-------------  2009    partial removal of kidney due to RCC    Back surgery  2009    Biopsy of breast, needle core Left 10/27/2020    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 Limon    Breast reduction Right 2020    right breast reduction for symmetry, Dr. Limon    Colonoscopy  2006    Dr Llamas    Colonoscopy diagnostic  2016    Dr Saldana     Hysterectomy  1998    ovaries were removed also    Mammo stereotactic biopsy left Left 10/21/2022    Benign    Tonsillectomy and adenoidectomy  as a child     Family History   Problem Relation Age of Onset    Cataracts Mother     Heart disease Mother     Osteoarthritis Mother     Heart disease Father     Cancer Father 90        Bladder    Postmenopausal breast cancer Maternal Grandmother 64    Kidney disease Paternal Grandfather     Cancer Paternal Aunt 80        Bladder    Premenopausal breast cancer Maternal Aunt 48    Bilateral breast cancer Maternal Aunt         Dx?    Genetics Maternal Aunt         BRCA neg, per pt    Bilateral breast cancer Maternal Aunt 62    Postmenopausal breast cancer Maternal Cousin 70    Postmenopausal breast cancer Maternal Cousin 70    Genetics Maternal Cousin         BRCA neg, per pt    Postmenopausal breast cancer Maternal Cousin 64    Genetics Maternal Cousin         GT negative, ~60 gene hereditary cancer panel through [employer] (including BRCA), completed 2018   F and pAunt bladder cancer  pUncle renal cell carcinoma  pGF  of kidney disease (?cancer)  mAunt x 2 breast cancer, one in late 30's  mGM uterine cancer    Social History     Tobacco Use    Smoking status: Never    Smokeless tobacco: Never   Substance Use Topics    Alcohol use: Not Currently     Alcohol/week: 0.0 - 2.0 standard drinks of alcohol    Drug use: No   Lives in [place]. . 2 children. Walks, dances.    REVIEW OF SYSTEMS:    All other systems are reviewed and are negative except as documented in the history of present illness.    PHYSICAL EXAM:  Vital Signs:   Oncology Encounter Vitals [24 1226]   ONC OP Encounter Vitals Group      BP (!) 152/86      Heart Rate 74       Resp       Temp 97.9 °F (36.6 °C)      Temp src Oral      SpO2 95 %      Weight 186 lb 9.6 oz (84.6 kg)      Height 5' 4\" (1.626 m)      Pain Score  0      Pain Location       Pain Education?       BSA (Calculated - m2) - Moshe [employer] Moshe 1.9      BSA (Calculated - sq m) 1.95      BMI (Calculated) 32.03       QOPI Data:     Oral Chemo: NA.    Psychiatric ROS: Negative for change in affect, anxiety, depression, insomnia, mentation or sleep disturbance.    ECOG Performance Status:   ECOG [07/23/24 1223]   ECOG Performance Status 0     General: The patient is alert, well-developed, well-nourished, no distress.  Skin: Warm, normal color, normal texture, normal turgor and without rash.  Head: Normocephalic, atraumatic.  Eyes: Normal conjunctivae and sclerae. Pupils equal, round.    Breasts:  Patient deferred exam  Abdomen: Abdomen is soft and non-tender with active bowel sounds. There is no detected hepatosplenomegaly, masses, or ascites.  Extremities: No clubbing, no cyanosis, no edema and normal muscle tone and development bilaterally.  Lymph: No cervical, supraclavicular, axillary lymphadenopathy.  Neurologic: Motor strength normal, coordination normal, no tremor noted.  Psychiatric: Cooperative. Appropriate mood and affect. Normal judgment.    LABORATORY DATA:  No results for input(s): \"WBC\", \"RBC\", \"HGB\", \"HCT\", \"MCV\", \"PLT\", \"ANEUT\", \"ALYMS\", \"TIP\", \"AEOS\", \"ABASO\" in the last 8765 hours.  Recent Labs   Lab 05/20/24 0723 03/19/24  0724   Glucose  --  105*   Sodium  --  141   Potassium  --  4.2   Chloride  --  108   Carbon Dioxide  --  29   BUN  --  15   Creatinine  --  0.86   Calcium  --  9.2   Protein, Total 6.0* 6.7   Albumin 3.3* 3.6   GOT/AST 23 22   Alkaline Phosphatase 83 88   GPT 34 29     Recent Labs   Lab 05/20/24 0723 03/19/24  0724   Anion Gap  --  8   Globulin  --  3.1   Bilirubin, Total 0.4 0.6       IMAGING STUDIES:  Imaging studies -- sided diagnostic mammogram 04/25/2023: BI-RADS category  2    ASSESSMENT:  1.  DCIS with microinvasion:  We reviewed the biology of DCIS and micro invasive disease.  She has had surgical resection with only noninvasive disease remaining on final pathology.  Since this was micro invasive triple negative disease, she does not require adjuvant chemotherapy and there would be no benefit to the use of endocrine therapy.   We again discussed the role of diet and exercise in minimizing the risk of breast cancer recurrence.   We discussed follow-up in since she has 2 clinical breast exams per year with a very good prognosis, I will see her back as needed.  We discussed symptoms of concern to report and she will call if they occur.  I wished her well.  2.  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    The patient indicated understanding of the diagnosis and agreed with the plan of care.        A copy of this note was sent to the requesting provider.   yes